# Patient Record
Sex: FEMALE | Race: WHITE | Employment: OTHER | URBAN - METROPOLITAN AREA
[De-identification: names, ages, dates, MRNs, and addresses within clinical notes are randomized per-mention and may not be internally consistent; named-entity substitution may affect disease eponyms.]

---

## 2017-01-24 ENCOUNTER — HOSPITAL ENCOUNTER (OUTPATIENT)
Dept: GENERAL RADIOLOGY | Age: 82
Discharge: HOME | End: 2017-01-24
Attending: INTERNAL MEDICINE | Admitting: INTERNAL MEDICINE

## 2017-01-24 ENCOUNTER — OFFICE VISIT (OUTPATIENT)
Age: 82
End: 2017-01-24

## 2017-01-24 VITALS
HEIGHT: 62 IN | SYSTOLIC BLOOD PRESSURE: 140 MMHG | DIASTOLIC BLOOD PRESSURE: 76 MMHG | BODY MASS INDEX: 25.4 KG/M2 | WEIGHT: 138 LBS

## 2017-01-24 DIAGNOSIS — E83.52 HYPERCALCEMIA: Chronic | ICD-10-CM

## 2017-01-24 DIAGNOSIS — R82.994 HYPERCALCIURIA: Chronic | ICD-10-CM

## 2017-01-24 DIAGNOSIS — M81.0 OSTEOPOROSIS, POSTMENOPAUSAL: Primary | Chronic | ICD-10-CM

## 2017-01-24 DIAGNOSIS — M81.0 OSTEOPOROSIS, POSTMENOPAUSAL: Chronic | ICD-10-CM

## 2017-01-24 DIAGNOSIS — E55.9 VITAMIN D DEFICIENCY: Chronic | ICD-10-CM

## 2017-01-24 DIAGNOSIS — Z51.81 MEDICATION MONITORING ENCOUNTER: Chronic | ICD-10-CM

## 2017-01-24 PROCEDURE — 1123F ACP DISCUSS/DSCN MKR DOCD: CPT | Performed by: INTERNAL MEDICINE

## 2017-01-24 PROCEDURE — 1090F PRES/ABSN URINE INCON ASSESS: CPT | Performed by: INTERNAL MEDICINE

## 2017-01-24 PROCEDURE — G8427 DOCREV CUR MEDS BY ELIG CLIN: HCPCS | Performed by: INTERNAL MEDICINE

## 2017-01-24 PROCEDURE — 99215 OFFICE O/P EST HI 40 MIN: CPT | Performed by: INTERNAL MEDICINE

## 2017-01-24 PROCEDURE — 4040F PNEUMOC VAC/ADMIN/RCVD: CPT | Performed by: INTERNAL MEDICINE

## 2017-01-24 PROCEDURE — G8399 PT W/DXA RESULTS DOCUMENT: HCPCS | Performed by: INTERNAL MEDICINE

## 2017-01-24 PROCEDURE — G8420 CALC BMI NORM PARAMETERS: HCPCS | Performed by: INTERNAL MEDICINE

## 2017-01-24 PROCEDURE — G8484 FLU IMMUNIZE NO ADMIN: HCPCS | Performed by: INTERNAL MEDICINE

## 2017-01-24 PROCEDURE — 77080 DXA BONE DENSITY AXIAL: CPT | Performed by: INTERNAL MEDICINE

## 2017-01-24 PROCEDURE — 4005F PHARM THX FOR OP RXD: CPT | Performed by: INTERNAL MEDICINE

## 2017-01-24 PROCEDURE — 1036F TOBACCO NON-USER: CPT | Performed by: INTERNAL MEDICINE

## 2017-01-24 RX ORDER — CLOPIDOGREL BISULFATE 75 MG/1
75 TABLET ORAL DAILY
COMMUNITY
End: 2017-08-22 | Stop reason: SDUPTHER

## 2017-01-24 RX ORDER — VITAMIN B COMPLEX
1 CAPSULE ORAL DAILY
COMMUNITY

## 2017-01-24 RX ORDER — FUROSEMIDE 20 MG/1
20 TABLET ORAL 2 TIMES DAILY
COMMUNITY

## 2017-01-24 RX ORDER — ZOLEDRONIC ACID 5 MG/100ML
5 INJECTION, SOLUTION INTRAVENOUS ONCE
Qty: 100 ML | Refills: 0 | Status: SHIPPED | OUTPATIENT
Start: 2017-01-24 | End: 2017-01-24

## 2017-01-30 ENCOUNTER — TELEPHONE (OUTPATIENT)
Age: 82
End: 2017-01-30

## 2017-02-01 ENCOUNTER — PROCEDURE VISIT (OUTPATIENT)
Age: 82
End: 2017-02-01

## 2017-02-01 DIAGNOSIS — M81.0 OSTEOPOROSIS, POSTMENOPAUSAL: Primary | Chronic | ICD-10-CM

## 2017-02-13 ENCOUNTER — HOSPITAL ENCOUNTER (OUTPATIENT)
Dept: ONCOLOGY | Age: 82
Discharge: HOME | End: 2017-02-28
Attending: INTERNAL MEDICINE | Admitting: INTERNAL MEDICINE

## 2017-02-13 VITALS — TEMPERATURE: 98.4 F | HEART RATE: 60 BPM | SYSTOLIC BLOOD PRESSURE: 124 MMHG | DIASTOLIC BLOOD PRESSURE: 74 MMHG

## 2017-02-13 RX ORDER — OMEPRAZOLE/SODIUM BICARBONATE 20MG-1.1G
CAPSULE ORAL
COMMUNITY
End: 2017-08-22 | Stop reason: SDUPTHER

## 2017-02-13 RX ORDER — ZOLEDRONIC ACID 5 MG/100ML
5 INJECTION, SOLUTION INTRAVENOUS ONCE
Status: COMPLETED | OUTPATIENT
Start: 2017-02-13 | End: 2017-02-13

## 2017-02-13 RX ADMIN — ZOLEDRONIC ACID 5 MG: 5 INJECTION, SOLUTION INTRAVENOUS at 10:12

## 2017-05-09 ENCOUNTER — TELEPHONE (OUTPATIENT)
Dept: INTERNAL MEDICINE | Age: 82
End: 2017-05-09

## 2017-05-09 DIAGNOSIS — M25.572 LEFT ANKLE PAIN, UNSPECIFIED CHRONICITY: Primary | ICD-10-CM

## 2017-05-15 ENCOUNTER — HOSPITAL ENCOUNTER (OUTPATIENT)
Dept: OTHER | Age: 82
Discharge: HOME | End: 2017-05-15
Attending: INTERNAL MEDICINE | Admitting: INTERNAL MEDICINE

## 2017-05-15 DIAGNOSIS — M25.572 LEFT ANKLE PAIN, UNSPECIFIED CHRONICITY: ICD-10-CM

## 2017-05-19 ENCOUNTER — TELEPHONE (OUTPATIENT)
Dept: INTERNAL MEDICINE | Age: 82
End: 2017-05-19

## 2017-07-05 ENCOUNTER — TELEPHONE (OUTPATIENT)
Dept: INTERNAL MEDICINE | Age: 82
End: 2017-07-05

## 2017-07-31 ENCOUNTER — TELEPHONE (OUTPATIENT)
Dept: INTERNAL MEDICINE | Age: 82
End: 2017-07-31

## 2017-07-31 DIAGNOSIS — M79.671 RIGHT FOOT PAIN: Primary | ICD-10-CM

## 2017-08-02 ENCOUNTER — OFFICE VISIT (OUTPATIENT)
Dept: ORTHOPEDIC SURGERY | Age: 82
End: 2017-08-02

## 2017-08-02 VITALS
SYSTOLIC BLOOD PRESSURE: 145 MMHG | BODY MASS INDEX: 25.4 KG/M2 | HEIGHT: 62 IN | HEART RATE: 62 BPM | WEIGHT: 138.01 LBS | DIASTOLIC BLOOD PRESSURE: 74 MMHG

## 2017-08-02 DIAGNOSIS — S92.351A JONES FRACTURE, RIGHT, CLOSED, INITIAL ENCOUNTER: ICD-10-CM

## 2017-08-02 DIAGNOSIS — M79.671 RIGHT FOOT PAIN: Primary | ICD-10-CM

## 2017-08-02 PROBLEM — S99.199A JONES FRACTURE: Status: ACTIVE | Noted: 2017-08-02

## 2017-08-02 PROCEDURE — 1036F TOBACCO NON-USER: CPT | Performed by: ORTHOPAEDIC SURGERY

## 2017-08-02 PROCEDURE — 1090F PRES/ABSN URINE INCON ASSESS: CPT | Performed by: ORTHOPAEDIC SURGERY

## 2017-08-02 PROCEDURE — 4040F PNEUMOC VAC/ADMIN/RCVD: CPT | Performed by: ORTHOPAEDIC SURGERY

## 2017-08-02 PROCEDURE — G8427 DOCREV CUR MEDS BY ELIG CLIN: HCPCS | Performed by: ORTHOPAEDIC SURGERY

## 2017-08-02 PROCEDURE — L4361 PNEUMA/VAC WALK BOOT PRE OTS: HCPCS | Performed by: ORTHOPAEDIC SURGERY

## 2017-08-02 PROCEDURE — 28470 CLTX METATARSAL FX WO MNP EA: CPT | Performed by: ORTHOPAEDIC SURGERY

## 2017-08-02 PROCEDURE — G8399 PT W/DXA RESULTS DOCUMENT: HCPCS | Performed by: ORTHOPAEDIC SURGERY

## 2017-08-02 PROCEDURE — 99214 OFFICE O/P EST MOD 30 MIN: CPT | Performed by: ORTHOPAEDIC SURGERY

## 2017-08-02 PROCEDURE — 1123F ACP DISCUSS/DSCN MKR DOCD: CPT | Performed by: ORTHOPAEDIC SURGERY

## 2017-08-02 PROCEDURE — G8419 CALC BMI OUT NRM PARAM NOF/U: HCPCS | Performed by: ORTHOPAEDIC SURGERY

## 2017-08-15 RX ORDER — DIAZEPAM 5 MG/1
TABLET ORAL
Qty: 90 TABLET | Refills: 0 | Status: SHIPPED | OUTPATIENT
Start: 2017-08-15 | End: 2017-08-22 | Stop reason: SDUPTHER

## 2017-08-22 ENCOUNTER — OFFICE VISIT (OUTPATIENT)
Dept: ORTHOPEDIC SURGERY | Age: 82
End: 2017-08-22

## 2017-08-22 VITALS
WEIGHT: 138.01 LBS | SYSTOLIC BLOOD PRESSURE: 139 MMHG | HEIGHT: 62 IN | BODY MASS INDEX: 25.4 KG/M2 | HEART RATE: 66 BPM | DIASTOLIC BLOOD PRESSURE: 78 MMHG

## 2017-08-22 DIAGNOSIS — S92.351D: ICD-10-CM

## 2017-08-22 DIAGNOSIS — M79.671 RIGHT FOOT PAIN: Primary | ICD-10-CM

## 2017-08-22 PROCEDURE — 73630 X-RAY EXAM OF FOOT: CPT | Performed by: ORTHOPAEDIC SURGERY

## 2017-08-22 PROCEDURE — 99024 POSTOP FOLLOW-UP VISIT: CPT | Performed by: ORTHOPAEDIC SURGERY

## 2017-08-22 RX ORDER — LATANOPROST 50 UG/ML
SOLUTION/ DROPS OPHTHALMIC
COMMUNITY
Start: 2017-06-05

## 2017-08-22 RX ORDER — ACETAMINOPHEN 500 MG
500 TABLET ORAL PRN
COMMUNITY

## 2017-08-22 RX ORDER — LATANOPROST 50 UG/ML
SOLUTION/ DROPS OPHTHALMIC
COMMUNITY
Start: 2017-07-25 | End: 2017-08-22 | Stop reason: SDUPTHER

## 2017-08-22 RX ORDER — CARVEDILOL 25 MG/1
TABLET ORAL
COMMUNITY
Start: 2017-03-10

## 2017-08-22 RX ORDER — CLOPIDOGREL BISULFATE 75 MG/1
75 TABLET ORAL
COMMUNITY
Start: 2017-03-17

## 2017-08-22 RX ORDER — OMEGA-3-ACID ETHYL ESTERS 1 G/1
1 CAPSULE, LIQUID FILLED ORAL
COMMUNITY

## 2017-08-22 RX ORDER — LISINOPRIL 10 MG/1
TABLET ORAL
COMMUNITY
Start: 2017-08-11 | End: 2017-08-22 | Stop reason: SDUPTHER

## 2017-08-22 RX ORDER — LISINOPRIL 10 MG/1
10 TABLET ORAL 2 TIMES DAILY
COMMUNITY
Start: 2017-02-16

## 2017-08-22 RX ORDER — AMLODIPINE BESYLATE 5 MG/1
5 TABLET ORAL
COMMUNITY
Start: 2017-03-17

## 2017-08-22 RX ORDER — OMEPRAZOLE/SODIUM BICARBONATE 20MG-1.1G
CAPSULE ORAL
COMMUNITY

## 2017-08-22 RX ORDER — ERGOCALCIFEROL (VITAMIN D2) 10 MCG
400 TABLET ORAL
COMMUNITY

## 2017-08-22 RX ORDER — ACETAMINOPHEN 160 MG
TABLET,DISINTEGRATING ORAL
COMMUNITY
End: 2017-08-22 | Stop reason: SDUPTHER

## 2017-08-22 RX ORDER — ASPIRIN 81 MG/1
81 TABLET ORAL
COMMUNITY

## 2017-09-15 ENCOUNTER — TELEPHONE (OUTPATIENT)
Dept: INTERNAL MEDICINE | Age: 82
End: 2017-09-15

## 2017-09-19 ENCOUNTER — OFFICE VISIT (OUTPATIENT)
Dept: ORTHOPEDIC SURGERY | Age: 82
End: 2017-09-19

## 2017-09-19 VITALS
HEART RATE: 64 BPM | BODY MASS INDEX: 25.4 KG/M2 | DIASTOLIC BLOOD PRESSURE: 75 MMHG | SYSTOLIC BLOOD PRESSURE: 134 MMHG | WEIGHT: 138.01 LBS | HEIGHT: 62 IN

## 2017-09-19 DIAGNOSIS — S92.351A JONES FRACTURE, RIGHT, CLOSED, INITIAL ENCOUNTER: Primary | ICD-10-CM

## 2017-09-19 PROCEDURE — 73630 X-RAY EXAM OF FOOT: CPT | Performed by: ORTHOPAEDIC SURGERY

## 2017-09-19 PROCEDURE — 99024 POSTOP FOLLOW-UP VISIT: CPT | Performed by: ORTHOPAEDIC SURGERY

## 2017-12-04 ENCOUNTER — TELEPHONE (OUTPATIENT)
Dept: INTERNAL MEDICINE | Age: 82
End: 2017-12-04

## 2017-12-04 NOTE — TELEPHONE ENCOUNTER
She is having a lot of neuropathy in her feet - who to see for this? She has seen Dr. Windle Hodgkins in the past - is this the right kind of doctor?

## 2018-01-29 ENCOUNTER — TELEPHONE (OUTPATIENT)
Dept: ENDOCRINOLOGY | Age: 83
End: 2018-01-29

## 2018-02-06 ENCOUNTER — OFFICE VISIT (OUTPATIENT)
Age: 83
End: 2018-02-06

## 2018-02-06 ENCOUNTER — PROCEDURE VISIT (OUTPATIENT)
Age: 83
End: 2018-02-06

## 2018-02-06 ENCOUNTER — HOSPITAL ENCOUNTER (OUTPATIENT)
Dept: GENERAL RADIOLOGY | Age: 83
Discharge: HOME | End: 2018-02-06
Admitting: INTERNAL MEDICINE

## 2018-02-06 VITALS
HEIGHT: 62 IN | SYSTOLIC BLOOD PRESSURE: 128 MMHG | DIASTOLIC BLOOD PRESSURE: 66 MMHG | BODY MASS INDEX: 25.8 KG/M2 | WEIGHT: 140.2 LBS

## 2018-02-06 DIAGNOSIS — Z51.81 MEDICATION MONITORING ENCOUNTER: Chronic | ICD-10-CM

## 2018-02-06 DIAGNOSIS — M81.0 OSTEOPOROSIS, POSTMENOPAUSAL: Primary | Chronic | ICD-10-CM

## 2018-02-06 DIAGNOSIS — M81.0 OSTEOPOROSIS, POSTMENOPAUSAL: Chronic | ICD-10-CM

## 2018-02-06 DIAGNOSIS — E83.52 HYPERCALCEMIA: Chronic | ICD-10-CM

## 2018-02-06 DIAGNOSIS — R82.994 HYPERCALCIURIA: Chronic | ICD-10-CM

## 2018-02-06 DIAGNOSIS — E55.9 VITAMIN D DEFICIENCY: Chronic | ICD-10-CM

## 2018-02-06 LAB
ALBUMIN SERPL-MCNC: 4.6 G/DL (ref 3.4–5)
CALCIUM SERPL-MCNC: 10.9 MG/DL (ref 8.3–10.6)
CREAT SERPL-MCNC: 0.7 MG/DL (ref 0.6–1.2)
GFR AFRICAN AMERICAN: >60
GFR NON-AFRICAN AMERICAN: >60
PARATHYROID HORMONE INTACT: 47.5 PG/ML (ref 14–72)

## 2018-02-06 PROCEDURE — G8419 CALC BMI OUT NRM PARAM NOF/U: HCPCS | Performed by: INTERNAL MEDICINE

## 2018-02-06 PROCEDURE — 77080 DXA BONE DENSITY AXIAL: CPT | Performed by: INTERNAL MEDICINE

## 2018-02-06 PROCEDURE — 1036F TOBACCO NON-USER: CPT | Performed by: INTERNAL MEDICINE

## 2018-02-06 PROCEDURE — G8399 PT W/DXA RESULTS DOCUMENT: HCPCS | Performed by: INTERNAL MEDICINE

## 2018-02-06 PROCEDURE — 99215 OFFICE O/P EST HI 40 MIN: CPT | Performed by: INTERNAL MEDICINE

## 2018-02-06 PROCEDURE — G8427 DOCREV CUR MEDS BY ELIG CLIN: HCPCS | Performed by: INTERNAL MEDICINE

## 2018-02-06 PROCEDURE — G8484 FLU IMMUNIZE NO ADMIN: HCPCS | Performed by: INTERNAL MEDICINE

## 2018-02-06 PROCEDURE — 1090F PRES/ABSN URINE INCON ASSESS: CPT | Performed by: INTERNAL MEDICINE

## 2018-02-06 PROCEDURE — 1123F ACP DISCUSS/DSCN MKR DOCD: CPT | Performed by: INTERNAL MEDICINE

## 2018-02-06 PROCEDURE — 4040F PNEUMOC VAC/ADMIN/RCVD: CPT | Performed by: INTERNAL MEDICINE

## 2018-02-06 RX ORDER — ZOLEDRONIC ACID 5 MG/100ML
5 INJECTION, SOLUTION INTRAVENOUS ONCE
Qty: 100 ML | Refills: 0 | Status: SHIPPED | OUTPATIENT
Start: 2018-02-06 | End: 2018-02-06

## 2018-02-06 RX ORDER — MAGNESIUM OXIDE 400 MG/1
400 TABLET ORAL DAILY
COMMUNITY

## 2018-02-06 NOTE — PROGRESS NOTES
[facility]) Osteoporosis and [address] [facility] [address] [address]  Phone [phone]  Fax [phone]    PATIENT NAME: Michael Graves OF BIRTH: 1935  INITIAL CONSULTATION: 11/10/2008  LAST OFFICE VISIT: 01/24/2017  TODAY'S VISIT: 02/06/2018                       Labs @ . E 08/2017    PROBLEMS:   Osteoporosis by DXA done 03/2002, lowest T-score -2.9 in the spine (L1-L4)    Family history of osteoporosis, sister    No fractures      BMD decreased [id number], again [id number] and further [id number] despite bisphosphonate therapy, lowest T-score -3.3 at the spine (L3, L4)  Natural menopause age 47 (66)  Osteoarthritis primarily involving her knees, left knee replacement 2012  Dermatitis herpetiformis, celiac testing on gluten free diet was negative   Hypertension  Glaucoma  Neurocardiogenic syncope  GERD controlled with medication  Coronary artery disease, pacemaker inserted 2001  Squamous cell carcinoma of left shoulder age 44 (37)  Vitamin D deficiency, desirable 25-OHD 30 to 60 ng/mL    20 ng/mL 09/2008     28 ng/mL 11/2008 on 900 IU daily    41 ng/mL 03/2009 on 2900 IU daily      44 ng/mL 08/2012    26 ng/mL 04/2014    51 ng/mL 05/2015  Hypercalciuria, normal 24-hour urine calcium for her is 100-240 mg/d    387 mg/d 11/2007 on no treatment    280 mg/d 01/2009 on HCTZ 12.5 mg/d    248 mg/d 03/2009 on HCTZ 25 mg/d (stopped by Dr. Shira Correa in 2012 due to bladder control issues)    252 mg/d 08/2012  Hypercalcemia:     07/2011 10.2 x 4. 10/2016 Ca 10.5 (ULN 10.2), albumin 4.7 (3-2-4.6). 10/2016 10.5. 08/2017 10.6.     CURRENT MANAGEMENT FOR BONE HEALTH:   Calcium: 600 mg/d diet + antacids  Vitamin D: 400 IU with multivitamin + 2000 IU = 2900 IU/d   Exercise: usually walks 3 x weekly, recumbent bike  Pharmacologic therapy: Reclast 04/2015    PREVIOUS MEDICATIONS FOR OSTEOPOROSIS:   Evista [id number]  Actonel 2002-08/2005, stopped => gastric reflux  Boniva 150

## 2018-02-06 NOTE — PROGRESS NOTES
[facility]) Osteoporosis and [address] [facility] [address] [place] [address]   Phone [phone]  Fax [phone]    NAME: Antony Maldonado ([facility])   : 1935   STUDY DATE: 2018     REFERRING PROVIDER: Lizzie Dave MD    INDICATION(S) FOR PERFORMING THE STUDY:  osteoporosis, age-related (M81.0)    CLINICAL INFORMATION PROVIDED BY THE PATIENT: 80-year-old woman. She started natural menopause at age 47. She has not taken estrogen. No history of fragility fractures. No long-term corticosteroid use. She took Evista from  to . She took Actonel from  to 2005 (stopped because of GERD). She took Boniva [id number]/2009, was treated with Reclast 2009-2012) and took [facility] 2012-2015. She is being treated with Reclast started 2015. EQUIPMENT: [employer]. POSITIONING: Good. REGIONS OF INTEREST: Correct. ARTIFACTS: None. STUDY VALID? Yes; L1 and L2 were deleted. T-scores  Initial study: 2002 spine L3-L4 -3.1 Lowest hip (left fem. neck) -1.5   Current study: 2018 spine L3-L4 -2.2 Lowest hip (left fem. neck) -1.4     The table below shows bone mineral density (grams/cm2), the appropriate measure for comparing serial scans An increase or decrease is significant based on precision studies done at our center according to the ISCD protocol. PA spine Proximal Femur (left)   Date L3-L4 Fem. neck Trochanter Total hip   2002 0.759 0.681 NA 0.861   2005 0.774 0.730 NA 0.894   2008 0.709 0.682 0.614 0.880   2009 0.750 0.697 0.633 0.886   2010 0.792 0.713 0.586 0.841   2011 0.802 0.688 0.588 0.847   2012 0.736 0.659 0.543 0.793   10/01/2013 0.767 0.686 0.562 0.799   2015 0.818 0.684 0.569 0.808   2017 0.826 0.679 0.574 0.821   2018 0.863 0.694 0.576 0.828      IMPRESSION:  BONE DENSITY IS LOW, CONSISTENT WITH OSTEOPOROSIS.   BETWEEN  AND , BMD INCREASED IN

## 2018-02-07 DIAGNOSIS — E83.52 HYPERCALCEMIA: Primary | Chronic | ICD-10-CM

## 2018-02-28 PROBLEM — S99.199A JONES FRACTURE: Status: RESOLVED | Noted: 2017-08-02 | Resolved: 2018-02-28

## 2018-03-02 ENCOUNTER — OFFICE VISIT (OUTPATIENT)
Dept: INTERNAL MEDICINE | Age: 83
End: 2018-03-02

## 2018-03-02 VITALS
SYSTOLIC BLOOD PRESSURE: 122 MMHG | HEIGHT: 61 IN | HEART RATE: 60 BPM | RESPIRATION RATE: 12 BRPM | BODY MASS INDEX: 26.43 KG/M2 | WEIGHT: 140 LBS | DIASTOLIC BLOOD PRESSURE: 70 MMHG

## 2018-03-02 DIAGNOSIS — E55.9 VITAMIN D DEFICIENCY: Chronic | ICD-10-CM

## 2018-03-02 DIAGNOSIS — Z13.29 SCREENING FOR THYROID DISORDER: ICD-10-CM

## 2018-03-02 DIAGNOSIS — Z00.00 MEDICARE ANNUAL WELLNESS VISIT, SUBSEQUENT: Primary | ICD-10-CM

## 2018-03-02 DIAGNOSIS — Z12.11 SPECIAL SCREENING FOR MALIGNANT NEOPLASMS, COLON: ICD-10-CM

## 2018-03-02 DIAGNOSIS — Z00.00 PERIODIC HEALTH ASSESSMENT, GENERAL SCREENING, ADULT: ICD-10-CM

## 2018-03-02 DIAGNOSIS — I73.9 PERIPHERAL VASCULAR DISEASE (HCC): ICD-10-CM

## 2018-03-02 DIAGNOSIS — E78.5 HYPERLIPIDEMIA, UNSPECIFIED HYPERLIPIDEMIA TYPE: ICD-10-CM

## 2018-03-02 DIAGNOSIS — M81.0 OSTEOPOROSIS, POSTMENOPAUSAL: Chronic | ICD-10-CM

## 2018-03-02 LAB
BILIRUBIN, POC: NORMAL
BLOOD URINE, POC: NORMAL
CLARITY, POC: CLEAR
COLOR, POC: YELLOW
GLUCOSE URINE, POC: NORMAL
KETONES, POC: NORMAL
LEUKOCYTE EST, POC: NORMAL
NITRITE, POC: NORMAL
PH, POC: 7.5
PROTEIN, POC: NORMAL
SPECIFIC GRAVITY, POC: 1.01
UROBILINOGEN, POC: NORMAL

## 2018-03-02 PROCEDURE — 81002 URINALYSIS NONAUTO W/O SCOPE: CPT | Performed by: INTERNAL MEDICINE

## 2018-03-02 PROCEDURE — 93000 ELECTROCARDIOGRAM COMPLETE: CPT | Performed by: INTERNAL MEDICINE

## 2018-03-02 PROCEDURE — G0439 PPPS, SUBSEQ VISIT: HCPCS | Performed by: INTERNAL MEDICINE

## 2018-03-02 RX ORDER — DIAZEPAM 5 MG/1
5 TABLET ORAL NIGHTLY PRN
COMMUNITY
End: 2018-05-08 | Stop reason: SDUPTHER

## 2018-03-02 ASSESSMENT — ANXIETY QUESTIONNAIRES: GAD7 TOTAL SCORE: 3

## 2018-03-02 ASSESSMENT — LIFESTYLE VARIABLES
HAS A RELATIVE, FRIEND, DOCTOR, OR ANOTHER HEALTH PROFESSIONAL EXPRESSED CONCERN ABOUT YOUR DRINKING OR SUGGESTED YOU CUT DOWN: 0
HOW OFTEN DURING THE LAST YEAR HAVE YOU BEEN UNABLE TO REMEMBER WHAT HAPPENED THE NIGHT BEFORE BECAUSE YOU HAD BEEN DRINKING: 0
HOW OFTEN DURING THE LAST YEAR HAVE YOU FOUND THAT YOU WERE NOT ABLE TO STOP DRINKING ONCE YOU HAD STARTED: 0
HOW OFTEN DO YOU HAVE SIX OR MORE DRINKS ON ONE OCCASION: 0
AUDIT TOTAL SCORE: 7
HAVE YOU OR SOMEONE ELSE BEEN INJURED AS A RESULT OF YOUR DRINKING: 0
HOW OFTEN DURING THE LAST YEAR HAVE YOU NEEDED AN ALCOHOLIC DRINK FIRST THING IN THE MORNING TO GET YOURSELF GOING AFTER A NIGHT OF HEAVY DRINKING: 0
HOW OFTEN DURING THE LAST YEAR HAVE YOU HAD A FEELING OF GUILT OR REMORSE AFTER DRINKING: 0
HOW OFTEN DO YOU HAVE A DRINK CONTAINING ALCOHOL: 4
HOW MANY STANDARD DRINKS CONTAINING ALCOHOL DO YOU HAVE ON A TYPICAL DAY: 3
AUDIT-C TOTAL SCORE: 7
HOW OFTEN DURING THE LAST YEAR HAVE YOU FAILED TO DO WHAT WAS NORMALLY EXPECTED FROM YOU BECAUSE OF DRINKING: 0

## 2018-03-02 ASSESSMENT — PATIENT HEALTH QUESTIONNAIRE - PHQ9: SUM OF ALL RESPONSES TO PHQ QUESTIONS 1-9: 1

## 2018-03-02 NOTE — PROGRESS NOTES
Zain Cavazos 81 y.o. presents today with   Chief Complaint   Patient presents with    Medicare AWV       Family History   Problem Relation Age of Onset    Cancer Mother 61     -breast Ca    Other Father 78     -ASHD       Social History     Social History    Marital status:      Spouse name: N/A    Number of children: 3    Years of education: N/A     Occupational History    Not on file.      Social History Main Topics    Smoking status: Former Smoker     Packs/day: 1.00     Years: 20.00     Types: Cigarettes     Quit date: 1968    Smokeless tobacco: Never Used      Comment: none in last 42 years    Alcohol use 0.0 oz/week      Comment: occasionally    Drug use: No    Sexual activity: Not on file     Other Topics Concern    Not on file     Social History Narrative    Living Will:  :Yes       Patient Active Problem List   Diagnosis    Hyperlipidemia    Peripheral vascular disease ([facility] [address]    Osteoporosis, postmenopausal    Vitamin D deficiency       Past Medical History:   Diagnosis Date    Dupuytren's contracture     GERD (gastroesophageal reflux disease)     Glaucoma     Hyperlipidemia     Metatarsal fracture *2017    Right - 5th    Migraine     Optical    Mitral regurgitation *    Moderate by echocardiogram     Osteopenia DEXA-2018    Lumbar T score of -2.2 and hip T score of -1.4 ( Dr. Shayla Cornejo )    Osteoporosis     Osteoporosis DEXA - Oct. 2, 2013    Lumbar T score -3.0 & Fem Neck T score - 1.5    Osteoporosis DEXA - 2015    Lumbar T score -2.6 and Hip T score -1.5    Other screening mammogram May 22, 2013    Negative    Other screening mammogram 2015    Negative    Pacemaker     Peripheral vascular disease ([facility] [address] *    OLEG:  R - 0.58 and L - 0.86    Raynaud phenomenon     Screening mammogram for high-risk patient 2011    Negative    Screening mammogram, encounter for *Dec. 27, 2017 Negative    Thrombocytosis ([facility] [address]         Past Surgical History:   Procedure Laterality Date    COLONOSCOPY   (  )    Dr. Joel Nicholas - normal colon    COLONOSCOPY  2012 (  )    Dr. Jessica Carrillo - no new polyps    INTRACAPSULAR CATARACT EXTRACTION  2007    Dr. Renzo Nowak - bilateral    IRIDOTOMY / IRIDECTOMY      Dr. Loan Rodrigues      PTCA  *Oct.19, 2016    Dr. Erwin Guido - right SFA    SIGMOIDOSCOPY  Jordan. 15, 2013    Dr. Jessica Carrillo - random bx, hemorrhoids    TOTAL KNEE ARTHROPLASTY      Dr. Euna Apgar      Dr. Joel Nicholas - [place] Eans  2014     Dr. Jessica Carrillo - gastric biopsy negative       Current Outpatient Prescriptions   Medication Sig Dispense Refill    diazepam (VALIUM) 5 MG tablet Take 5 mg by mouth nightly as needed for Anxiety.  diclofenac sodium (VOLTAREN) 1 % GEL Apply 2 g topically 2 times daily      magnesium oxide (MAG-OX) 400 MG tablet Take 400 mg by mouth daily      acetaminophen (TYLENOL) 500 MG tablet Take 500 mg by mouth as needed for Pain       amLODIPine (NORVASC) 5 MG tablet Take 5 mg by mouth      aspirin 81 MG EC tablet Take 81 mg by mouth      Calcium Carb-Cholecalciferol 600-800 MG-UNIT TABS Take by mouth      carvedilol (COREG) 25 MG tablet TAKE ONE HALF TABLET BY MOUTH TWICE A DAY      Vitamin D, Cholecalciferol, 400 units TABS Take 400 Units by mouth      clopidogrel (PLAVIX) 75 MG tablet Take 75 mg by mouth      latanoprost (XALATAN) 0.005 % ophthalmic solution INSTILL ONE DROP IN EACH EYE EVERY NIGHT AT BEDTIME      lisinopril (PRINIVIL;ZESTRIL) 10 MG tablet Take 10 mg by mouth 2 times daily       b complex vitamins capsule Take 1 capsule by mouth daily      therapeutic multivitamin-minerals (THERAGRAN-M) tablet Take 1 tablet by mouth daily.       fish oil-omega-3 fatty acids 1000 MG capsule Take 3 g by mouth daily.  zoledronic acid (RECLAST) 5 MG/100ML SOLN Infuse 100 mLs intravenously once for 1 dose 100 mL 0    omega-3 acid ethyl esters (LOVAZA) 1 g capsule Take 1 g by mouth      diazepam (VALIUM) 1 MG/ML solution Take 2 mg by mouth      Omega-3 Fatty Acids (FISH OIL PO) Take by mouth      Omeprazole-Sodium Bicarbonate  MG CAPS Take by mouth      Zoledronic Acid (RECLAST IV) Infuse 5 mg intravenously      furosemide (LASIX) 20 MG tablet Take 20 mg by mouth 2 times daily      BIOTIN PO Take by mouth      zoledronic acid (RECLAST) 5 MG/100ML SOLN Infuse 100 mLs intravenously once for 1 dose 100 mL 0    lisinopril-hydrochlorothiazide (PRINZIDE;ZESTORETIC) 20-12.5 MG per tablet Take 0.5 tablets by mouth 2 times daily       No current facility-administered medications for this visit. Allergies   Allergen Reactions    Latex Itching    Other Nausea Only     gleuten foods       Review of Systems:     Immunization History   Administered Date(s) Administered    Hepatitis A 12/11/2011, 07/08/2012    Hepatitis B, unspecified formulation 09/11/2012    Influenza Vaccine, unspecified formulation 08/31/2015, 10/01/2016    Pneumococcal 13-valent Conjugate ([id number]) 08/31/2015    Pneumococcal Conjugate 7-valent 12/08/2011    Pneumococcal Polysaccharide ([id number]) 02/16/2007    Td 07/08/2002    Tdap (Boostrix, Adacel) 12/08/2011    Typhoid Inactivated 12/23/2011    Zoster Subunit (Shingrix) 01/27/2018     Eye Exam:  February, 2018 by Dr. Blas Paulson. She is S/P bilateral cataract extraction  Chest: Denies: cough, hemoptysis, shortness of breath, pleuritic chest pain, wheezing  Cardiovascular: Denies: chest pain, dyspnea on exertion, orthopnea, paroxysmal nocturnal dyspnea, edema, palpitations  Abdomen: no abdominal pain, change in bowel habits, or black or bloody stools  Colonoscopy:  January, 2012 by Dr. Leandra Stoner revealed no new polyps.   To be repeated 2017 which she never did.  Fall Risk low  ADL   Without assistance  Mammography:December 27, 2017  DEXA: February, 2018 revealed a lumbar T score of -2.2 and a hip T score of -1.4 ( Dr. Shayla Cornejo )  Pelvic and PAP: N/A  Other:  N/A      Physical Exam:  General appearance: alert, appears stated age and cooperative  /71 (Site: Left Arm, Position: Sitting, Cuff Size: Medium Adult)   Pulse 60   Resp 12   Ht 5' 1\" (1.549 m)   Wt 140 lb (63.5 kg)   BMI 26.45 kg/m²   Eyes: conjunctivae/corneas clear. PERRL, EOM's intact. Fundi benign. Ears: normal TM's and external ear canals both ears  Nose: Nares normal. Septum midline. Mucosa normal. No drainage or sinus tenderness. Throat: no abnormalities  Neck: no adenopathy, no carotid bruit, no JVD, supple, symmetrical, trachea midline and thyroid not enlarged, symmetric, no tenderness/mass/nodules  Nodes:no adenopathy palpable  Breasts:Breasts symmetrical, skin without lesion(s), no nipple retraction or dimpling, no nipple discharge, no masses palpated, no axillary or supraclavicular adenopathy  Lungs: clear to auscultation bilaterally  Heart: regular rate and rhythm, S1, S2 normal, no murmur, click, rub or gallop  Abdomen: soft, non-tender; bowel sounds normal; no masses,  no organomegaly  Extremities: extremities normal, atraumatic, no cyanosis or edema  Neurological: Gait normal. Reflexes normal and symmetric.  Sensation grossly normal  Pulse: radial=4/4, femoral=4/4, popliteal=4/4, dorsalis pedis=4/4,   Skin: normal coloration and turgor, no rashes, no suspicious skin lesions noted  Psych: normal    Lab Review: not applicable  Assessment: Stable    Plan:              ECG, UA, fasting labs, and hemoccults  Hyperlipidemia - await labs, treatment pending results  PVD - stable, continue to monitor  Osteoporosis - improved, managed by Dr. Shayla Cornejo  Vitamin D deficiency - stable, managed by Dr. Qiana Burnett - mechanical, suffered a foot fracture, continue to monitor  Stress/anger - ongoing,

## 2018-03-08 LAB
ALBUMIN SERPL-MCNC: 4.6 G/DL
ALP BLD-CCNC: 52 U/L
ALT SERPL-CCNC: 16 U/L
ANION GAP SERPL CALCULATED.3IONS-SCNC: NORMAL MMOL/L
AST SERPL-CCNC: 18 U/L
BASOPHILS ABSOLUTE: 91 /ΜL
BASOPHILS RELATIVE PERCENT: 1.6 %
BILIRUB SERPL-MCNC: 0.5 MG/DL (ref 0.1–1.4)
BUN BLDV-MCNC: 11 MG/DL
CALCIUM SERPL-MCNC: 10.1 MG/DL
CHLORIDE BLD-SCNC: 101 MMOL/L
CHOLESTEROL, TOTAL: 255 MG/DL
CHOLESTEROL/HDL RATIO: 2.7
CO2: 28 MMOL/L
CREAT SERPL-MCNC: 0.72 MG/DL
EOSINOPHILS ABSOLUTE: 268 /ΜL
EOSINOPHILS RELATIVE PERCENT: 4.7 %
GFR CALCULATED: NORMAL
GLUCOSE BLD-MCNC: 95 MG/DL
HCT VFR BLD CALC: 40.6 % (ref 36–46)
HDLC SERPL-MCNC: 96 MG/DL (ref 35–70)
HEMOGLOBIN: 13.7 G/DL (ref 12–16)
LDL CHOLESTEROL CALCULATED: 138 MG/DL (ref 0–160)
LYMPHOCYTES ABSOLUTE: 1807 /ΜL
LYMPHOCYTES RELATIVE PERCENT: 31.7 %
MCH RBC QN AUTO: 31.9 PG
MCHC RBC AUTO-ENTMCNC: 33.7 G/DL
MCV RBC AUTO: 94.4 FL
MONOCYTES ABSOLUTE: 678 /ΜL
MONOCYTES RELATIVE PERCENT: 11.9 %
NEUTROPHILS ABSOLUTE: 2856 /ΜL
NEUTROPHILS RELATIVE PERCENT: 50.1 %
PDW BLD-RTO: 12.8 %
PLATELET # BLD: 437 K/ΜL
PMV BLD AUTO: 8.6 FL
POTASSIUM SERPL-SCNC: 4.4 MMOL/L
RBC # BLD: 4.3 10^6/ΜL
SODIUM BLD-SCNC: 137 MMOL/L
TOTAL PROTEIN: 7.2
TRIGL SERPL-MCNC: 106 MG/DL
TSH SERPL DL<=0.05 MIU/L-ACNC: 1.4 UIU/ML
VLDLC SERPL CALC-MCNC: ABNORMAL MG/DL
WBC # BLD: 5.7 10^3/ML

## 2018-03-13 DIAGNOSIS — E78.5 HYPERLIPIDEMIA, UNSPECIFIED HYPERLIPIDEMIA TYPE: ICD-10-CM

## 2018-03-13 DIAGNOSIS — Z13.29 SCREENING FOR THYROID DISORDER: ICD-10-CM

## 2018-03-13 DIAGNOSIS — Z00.00 PERIODIC HEALTH ASSESSMENT, GENERAL SCREENING, ADULT: ICD-10-CM

## 2018-03-19 DIAGNOSIS — Z12.11 SPECIAL SCREENING FOR MALIGNANT NEOPLASMS, COLON: ICD-10-CM

## 2018-03-19 LAB
CONTROL: NORMAL
HEMOCCULT STL QL: NORMAL

## 2018-03-19 PROCEDURE — 82274 ASSAY TEST FOR BLOOD FECAL: CPT | Performed by: INTERNAL MEDICINE

## 2018-04-18 ENCOUNTER — HOSPITAL ENCOUNTER (OUTPATIENT)
Dept: ONCOLOGY | Age: 83
Discharge: HOME | End: 2018-04-26
Attending: INTERNAL MEDICINE | Admitting: INTERNAL MEDICINE

## 2018-04-18 VITALS
TEMPERATURE: 97.5 F | RESPIRATION RATE: 18 BRPM | SYSTOLIC BLOOD PRESSURE: 147 MMHG | DIASTOLIC BLOOD PRESSURE: 76 MMHG | HEART RATE: 67 BPM

## 2018-04-18 RX ORDER — ZOLEDRONIC ACID 5 MG/100ML
5 INJECTION, SOLUTION INTRAVENOUS ONCE
Status: COMPLETED | OUTPATIENT
Start: 2018-04-18 | End: 2018-04-18

## 2018-04-18 RX ADMIN — ZOLEDRONIC ACID 5 MG: 5 INJECTION, SOLUTION INTRAVENOUS at 15:45

## 2018-05-08 DIAGNOSIS — F41.9 CHRONIC ANXIETY: Primary | ICD-10-CM

## 2018-05-09 RX ORDER — DIAZEPAM 5 MG/1
TABLET ORAL
Qty: 90 TABLET | Refills: 0 | Status: SHIPPED | OUTPATIENT
Start: 2018-05-09 | End: 2018-08-07

## 2018-07-30 DIAGNOSIS — S99.191D CLOSED FRACTURE OF BASE OF FIFTH METATARSAL BONE OF RIGHT FOOT AT METAPHYSEAL-DIAPHYSEAL JUNCTION WITH ROUTINE HEALING, SUBSEQUENT ENCOUNTER: Primary | ICD-10-CM

## 2018-08-03 ENCOUNTER — ORTHOTIC/BRACE ENCOUNTER (OUTPATIENT)
Dept: ORTHOPEDIC SURGERY | Age: 83
End: 2018-08-03

## 2018-08-09 ENCOUNTER — TELEPHONE (OUTPATIENT)
Dept: ORTHOPEDIC SURGERY | Age: 83
End: 2018-08-09

## 2018-08-09 NOTE — TELEPHONE ENCOUNTER
Poke to pt about picking up her orthotics from the [facility] office. She will be stopping by this afternoon (8/9/18) to pick them up.

## 2018-09-13 ENCOUNTER — TELEPHONE (OUTPATIENT)
Dept: INTERNAL MEDICINE | Age: 83
End: 2018-09-13

## 2018-10-01 ENCOUNTER — TELEPHONE (OUTPATIENT)
Dept: INTERNAL MEDICINE CLINIC | Age: 83
End: 2018-10-01

## 2018-10-01 DIAGNOSIS — R52 PAIN: Primary | ICD-10-CM

## 2018-10-16 DIAGNOSIS — F41.9 ANXIETY: Primary | ICD-10-CM

## 2018-10-16 RX ORDER — DIAZEPAM 5 MG/1
TABLET ORAL
Qty: 90 TABLET | Refills: 0 | Status: SHIPPED | OUTPATIENT
Start: 2018-10-16 | End: 2019-01-14

## 2019-03-04 DIAGNOSIS — F41.9 ANXIETY: Primary | ICD-10-CM

## 2019-03-04 RX ORDER — DIAZEPAM 5 MG/1
TABLET ORAL
Qty: 90 TABLET | Refills: 0 | Status: SHIPPED | OUTPATIENT
Start: 2019-03-04 | End: 2019-06-02

## 2019-03-29 ENCOUNTER — TELEPHONE (OUTPATIENT)
Dept: INTERNAL MEDICINE CLINIC | Age: 84
End: 2019-03-29

## 2019-04-02 ENCOUNTER — TELEPHONE (OUTPATIENT)
Dept: INTERNAL MEDICINE CLINIC | Age: 84
End: 2019-04-02

## 2019-04-02 NOTE — TELEPHONE ENCOUNTER
Yes but we only give a limited amount of xanax with no refills. Again I'm so sorry to hear about her 's passing.    Xanax 0.25 mgs  #20  One po qid prn

## 2019-06-24 DIAGNOSIS — F41.9 ANXIETY: Primary | ICD-10-CM

## 2019-06-24 RX ORDER — DIAZEPAM 5 MG/1
5 TABLET ORAL NIGHTLY PRN
Qty: 90 TABLET | Refills: 0 | Status: SHIPPED | OUTPATIENT
Start: 2019-06-24 | End: 2019-09-22

## 2019-09-26 RX ORDER — TRAZODONE HYDROCHLORIDE 50 MG/1
50 TABLET ORAL NIGHTLY
Qty: 30 TABLET | Refills: 12 | Status: SHIPPED | OUTPATIENT
Start: 2019-09-26

## 2019-09-26 RX ORDER — TRAZODONE HYDROCHLORIDE 50 MG/1
50 TABLET ORAL NIGHTLY
COMMUNITY
End: 2019-09-26 | Stop reason: SDUPTHER